# Patient Record
Sex: FEMALE
[De-identification: names, ages, dates, MRNs, and addresses within clinical notes are randomized per-mention and may not be internally consistent; named-entity substitution may affect disease eponyms.]

---

## 2024-09-04 PROBLEM — Z00.00 ENCOUNTER FOR PREVENTIVE HEALTH EXAMINATION: Status: ACTIVE | Noted: 2024-09-04

## 2024-09-16 ENCOUNTER — APPOINTMENT (OUTPATIENT)
Dept: UROGYNECOLOGY | Facility: CLINIC | Age: 39
End: 2024-09-16

## 2024-09-16 DIAGNOSIS — Z12.4 ENCOUNTER FOR SCREENING FOR MALIGNANT NEOPLASM OF CERVIX: ICD-10-CM

## 2024-09-16 DIAGNOSIS — R35.1 NOCTURIA: ICD-10-CM

## 2024-09-16 DIAGNOSIS — N39.46 MIXED INCONTINENCE: ICD-10-CM

## 2024-09-16 DIAGNOSIS — N81.6 RECTOCELE: ICD-10-CM

## 2024-09-16 PROCEDURE — 81003 URINALYSIS AUTO W/O SCOPE: CPT | Mod: QW

## 2024-09-16 PROCEDURE — 51701 INSERT BLADDER CATHETER: CPT | Mod: 59

## 2024-09-16 PROCEDURE — 99204 OFFICE O/P NEW MOD 45 MIN: CPT | Mod: 25
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
## 2024-09-16 NOTE — PROCEDURE
[Straight Catheterization] : insertion of a straight catheter [Patient] : the patient [___ Fr Straight Tip] : a [unfilled] in [place] straight tip catheter [None] : there were no complications with the catheter insertion [Culture] : culture [Urinalysis] : urinalysis [No Complications] : no complications [Tolerated Well] : the patient tolerated the procedure well [Post procedure instructions and information given] : Post procedure instructions and information were given and reviewed with patient. [0] : 0 [Stress Incontinence] : stress incontinence [Urgent Incontinence] : urgent incontinence [Clear] : clear

## 2024-09-16 NOTE — ASSESSMENT
[FreeTextEntry1] : Patient is a 39-year-old multipara with symptoms of mixed urinary incontinence (stress greater than urgency for more than 10 years with recent worsening as well as findings of stage II posterior vaginal wall prolapse on exam without concerns of defecatory dysfunction.  She has a hypermobile urethra with a negative cough stress test and a normal postvoid residual.  Patient has no GYN care or preventative medical care since the birth of her youngest child approximately 15 years ago

## 2024-09-16 NOTE — PHYSICAL EXAM
[Chaperone Present] : A chaperone was present in the examining room during all aspects of the physical examination [FreeTextEntry2] : Shanon Marquez [FreeTextEntry1] : General: Not in acute distress, alert and oriented x3. Neck: Supple. No lymphadenopathy.  Abdomen: Soft, nontender, and nondistended. No obvious hepatosplenomegaly. No obvious hernias. Pelvic Exam: Normal external female genitalia. Saddle sensory exam S2 to S4 is intact. Perineal reflexes not visualized. Urethra is hypermobile without prolapse, exudates, or lesions. Cough stress test is negative with and without prolapse reduction. Redundant tissue noted along the distal anterior vaginal wall. Post void residual was checked with I/O cath and was 60 cc of clear urine.  Normal-appearing vaginal epithelium.  Patulous vaginal walls.  No vaginal. Moderate amount of thick white vaginal discharge noted.  Affirm swab collected. Cervix without abnormal lesions.  Pap smear was collected.  Bimanual exam reveals a small uterus in normal positioning. No palpable adnexal masses or tenderness.  Posterior vaginal wall prolapse to +1 noted.  Rest of the vaginal compartments are well supported

## 2024-09-16 NOTE — PROCEDURE
[Straight Catheterization] : insertion of a straight catheter [Patient] : the patient [___ Fr Straight Tip] : a [unfilled] in [place] straight tip catheter [None] : there were no complications with the catheter insertion [Culture] : culture [Urinalysis] : urinalysis [No Complications] : no complications [Tolerated Well] : the patient tolerated the procedure well [Post procedure instructions and information given] : Post procedure instructions and information were given and reviewed with patient. [0] : 0 [Stress Incontinence] : stress incontinence [Urgent Incontinence] : urgent incontinence [Clear] : clear

## 2024-09-16 NOTE — DISCUSSION/SUMMARY
[FreeTextEntry1] : Patient was counseled regarding evaluation and management of mixed urinary incontinence as well as pelvic organ prolapse etc.  Following management plan was outlined after having a detailed discussion with the patient: 1.  Cath urine specimen was sent for urine analysis and culture.  There is evidence of urinary tract infection, will recommend antibiotic such as Keflex 500 mg p.o. twice daily for 7 days 2.  Discussed management options for stress urinary incontinence including supervised pelvic floor therapy, pelvic floor exercise program, incontinence ring pessary, urethral bulking as well as retropubic mid urethral sling placement.  After detailed discussion, patient states that she desires definitive surgery.  Recommended urodynamic testing for further evaluation of stress urinary incontinence since her cough stress test today was negative 3.  Discussed the findings of posterior vaginal wall prolapse on exam.  She denies defecatory dysfunction.  Denies vaginal bulge symptoms.  Discussed option of repairing the posterior vaginal wall at the time of sling procedure.  Discussed procedure of posterior colpoperineorrhaphy including outpatient nature of the procedure, recovery time etc.  She will follow-up after urodynamic testing 4.  Pap smear was collected and sent 5.  Affirm swab was ordered 6.  Follow-up after urodynamic testing

## 2024-09-17 DIAGNOSIS — N76.0 ACUTE VAGINITIS: ICD-10-CM

## 2024-09-17 DIAGNOSIS — B96.89 ACUTE VAGINITIS: ICD-10-CM

## 2024-09-17 LAB
APPEARANCE: CLEAR
BACTERIA: NEGATIVE /HPF
BILIRUBIN URINE: NEGATIVE
BLOOD URINE: NEGATIVE
CANDIDA VAG CYTO: NOT DETECTED
CAST: 0 /LPF
COLOR: YELLOW
EPITHELIAL CELLS: 2 /HPF
G VAGINALIS+PREV SP MTYP VAG QL MICRO: DETECTED
GLUCOSE QUALITATIVE U: NEGATIVE MG/DL
KETONES URINE: NEGATIVE MG/DL
LEUKOCYTE ESTERASE URINE: ABNORMAL
MICROSCOPIC-UA: NORMAL
NITRITE URINE: NEGATIVE
PH URINE: 6
PROTEIN URINE: NEGATIVE MG/DL
RED BLOOD CELLS URINE: 0 /HPF
SPECIFIC GRAVITY URINE: 1.02
T VAGINALIS VAG QL WET PREP: NOT DETECTED
UROBILINOGEN URINE: 0.2 MG/DL
WHITE BLOOD CELLS URINE: 3 /HPF

## 2024-09-17 RX ORDER — METRONIDAZOLE 500 MG/1
500 TABLET ORAL
Qty: 14 | Refills: 0 | Status: ACTIVE | COMMUNITY
Start: 2024-09-17 | End: 1900-01-01

## 2024-09-18 LAB
HPV 16 E6+E7 MRNA CVX QL NAA+PROBE: NOT DETECTED
HPV18+45 E6+E7 MRNA CVX QL NAA+PROBE: NOT DETECTED

## 2024-09-20 ENCOUNTER — NON-APPOINTMENT (OUTPATIENT)
Age: 39
End: 2024-09-20

## 2024-09-20 DIAGNOSIS — R30.0 DYSURIA: ICD-10-CM

## 2024-09-20 LAB — CYTOLOGY CVX/VAG DOC THIN PREP: NORMAL

## 2024-09-20 RX ORDER — CEPHALEXIN 500 MG/1
500 CAPSULE ORAL
Qty: 14 | Refills: 0 | Status: ACTIVE | COMMUNITY
Start: 2024-09-20 | End: 1900-01-01

## 2024-09-30 ENCOUNTER — APPOINTMENT (OUTPATIENT)
Dept: UROGYNECOLOGY | Facility: CLINIC | Age: 39
End: 2024-09-30

## 2024-10-07 ENCOUNTER — APPOINTMENT (OUTPATIENT)
Dept: UROGYNECOLOGY | Facility: CLINIC | Age: 39
End: 2024-10-07

## 2025-02-24 ENCOUNTER — APPOINTMENT (OUTPATIENT)
Dept: UROGYNECOLOGY | Facility: CLINIC | Age: 40
End: 2025-02-24